# Patient Record
Sex: FEMALE | Race: WHITE | HISPANIC OR LATINO | URBAN - METROPOLITAN AREA
[De-identification: names, ages, dates, MRNs, and addresses within clinical notes are randomized per-mention and may not be internally consistent; named-entity substitution may affect disease eponyms.]

---

## 2022-09-19 ENCOUNTER — OFFICE VISIT (OUTPATIENT)
Dept: URBAN - METROPOLITAN AREA CLINIC 78 | Facility: CLINIC | Age: 62
End: 2022-09-19

## 2023-09-06 ENCOUNTER — OFFICE VISIT (OUTPATIENT)
Dept: URBAN - METROPOLITAN AREA CLINIC 12 | Facility: CLINIC | Age: 63
End: 2023-09-06

## 2023-10-02 ENCOUNTER — OFFICE VISIT (OUTPATIENT)
Dept: URBAN - METROPOLITAN AREA CLINIC 78 | Facility: CLINIC | Age: 63
End: 2023-10-02

## 2023-11-20 ENCOUNTER — OFFICE VISIT (OUTPATIENT)
Dept: URBAN - METROPOLITAN AREA CLINIC 78 | Facility: CLINIC | Age: 63
End: 2023-11-20

## 2024-01-16 ENCOUNTER — OFFICE VISIT (OUTPATIENT)
Dept: URBAN - METROPOLITAN AREA CLINIC 78 | Facility: CLINIC | Age: 64
End: 2024-01-16
Payer: COMMERCIAL

## 2024-01-16 ENCOUNTER — DASHBOARD ENCOUNTERS (OUTPATIENT)
Age: 64
End: 2024-01-16

## 2024-01-16 VITALS
HEART RATE: 82 BPM | SYSTOLIC BLOOD PRESSURE: 128 MMHG | WEIGHT: 171 LBS | BODY MASS INDEX: 32.28 KG/M2 | TEMPERATURE: 98.1 F | HEIGHT: 61 IN | DIASTOLIC BLOOD PRESSURE: 70 MMHG

## 2024-01-16 DIAGNOSIS — Z86.010 PERSONAL HISTORY OF COLONIC POLYPS: ICD-10-CM

## 2024-01-16 DIAGNOSIS — R12 HEARTBURN: ICD-10-CM

## 2024-01-16 DIAGNOSIS — K59.01 CONSTIPATION: ICD-10-CM

## 2024-01-16 DIAGNOSIS — R14.0 BLOATING: ICD-10-CM

## 2024-01-16 PROBLEM — 162864005: Status: ACTIVE | Noted: 2024-01-16

## 2024-01-16 PROCEDURE — 99244 OFF/OP CNSLTJ NEW/EST MOD 40: CPT | Performed by: INTERNAL MEDICINE

## 2024-01-16 PROCEDURE — 99204 OFFICE O/P NEW MOD 45 MIN: CPT | Performed by: INTERNAL MEDICINE

## 2024-01-16 RX ORDER — LOSARTAN POTASSIUM 100 MG/1
1 TABLET TABLET, FILM COATED ORAL ONCE A DAY
Status: ACTIVE | COMMUNITY

## 2024-01-16 RX ORDER — HYDROCHLOROTHIAZIDE 25 MG/1
1 TABLET IN THE MORNING TABLET ORAL ONCE A DAY
Status: ACTIVE | COMMUNITY

## 2024-01-16 RX ORDER — ATORVASTATIN CALCIUM 10 MG/1
1 TABLET TABLET, FILM COATED ORAL ONCE A DAY
Status: ACTIVE | COMMUNITY

## 2024-01-16 NOTE — HPI-TODAY'S VISIT:
The patient was referred to us by Brunilda Padgett for bloating and abodminal pain. A copy of this note willbe sent to the referring physician.   She used to see Dr. Kelli Tse but she is not covered under her insurance. She woudl liek to get a second opinion.  She has been having a lot of postprandial belching. She was treated empirically with Omeprazole, which she does not feel helped.  She has worsening belching at nighttime.  She has noticed that lactose products tend to make this worse.  She admits that she is overweight and has beebn trying to lose weight. She has never had an EGD. She has nver been tested for H pylori or celiac sprue.   The patient has had several colonoscopy previously. She has had TAs. There is no FH of esoph/gastric/colon canceror colon polyps.   The patient has no pertinentadditional complaints of diarrhea, rectal pain, anorexia or unintentional weight loss.   She has constipation. She will use a stool softener. She has seen bleeding on and off.   Regarding any upper GI complaints, the patient has not had nausea, vomiting ordysphagia. She has occasional heartburn, especially if she eats late at night. She has been using Pepcid OTC at night with some relief.   The patient does not take blood thinners.  They deny any CP or HUERTAS. She is on Losartan/HCTZ for HTN and Atorvastatin for hypercholesterolemia.   She is from [place].   Summary of prior workup: - MRI: Sludge, no gallstones. No biliary dilation. No PD dialtion or pancreas mass. Fatty replacement of the pancreas Spleen and kidney cysts.  - Colonoscopy in 2022: Desc and sigm diverticulosis, 12 mm TA in the transverse (removed using cold forceps) , 10 mm HP polyp in the rectum (also removed with cold forceps biopsy). Quality of the prep was good however patient was told to have a repeat colonoscopy in 1 year (?). - Colonoscopy in 2017 by Dr. Kelli Tse: 2 HP rectal polyps.

## 2024-02-07 ENCOUNTER — EGD (OUTPATIENT)
Dept: URBAN - METROPOLITAN AREA SURGERY CENTER 15 | Facility: SURGERY CENTER | Age: 64
End: 2024-02-07

## 2024-09-05 ENCOUNTER — OFFICE VISIT (OUTPATIENT)
Dept: URBAN - METROPOLITAN AREA CLINIC 78 | Facility: CLINIC | Age: 64
End: 2024-09-05

## 2025-07-21 ENCOUNTER — OFFICE VISIT (OUTPATIENT)
Dept: URBAN - METROPOLITAN AREA CLINIC 78 | Facility: CLINIC | Age: 65
End: 2025-07-21

## 2025-07-23 ENCOUNTER — OFFICE VISIT (OUTPATIENT)
Dept: URBAN - METROPOLITAN AREA CLINIC 78 | Facility: CLINIC | Age: 65
End: 2025-07-23
Payer: COMMERCIAL

## 2025-07-23 DIAGNOSIS — K21.9 GASTROESOPHAGEAL REFLUX DISEASE, UNSPECIFIED WHETHER ESOPHAGITIS PRESENT: ICD-10-CM

## 2025-07-23 DIAGNOSIS — R14.0 ABDOMINAL BLOATING: ICD-10-CM

## 2025-07-23 DIAGNOSIS — K62.5 BRBPR (BRIGHT RED BLOOD PER RECTUM): ICD-10-CM

## 2025-07-23 DIAGNOSIS — K59.09 INTERMITTENT CONSTIPATION: ICD-10-CM

## 2025-07-23 DIAGNOSIS — Z86.0100 HX OF COLONIC POLYPS: ICD-10-CM

## 2025-07-23 PROBLEM — 14760008: Status: ACTIVE | Noted: 2025-07-23

## 2025-07-23 PROBLEM — 116289008: Status: ACTIVE | Noted: 2025-07-23

## 2025-07-23 PROBLEM — 74474003: Status: ACTIVE | Noted: 2025-07-23

## 2025-07-23 PROBLEM — 235595009: Status: ACTIVE | Noted: 2025-07-23

## 2025-07-23 PROBLEM — 428283002: Status: ACTIVE | Noted: 2025-07-23

## 2025-07-23 PROCEDURE — 99214 OFFICE O/P EST MOD 30 MIN: CPT

## 2025-07-23 RX ORDER — HYDROCHLOROTHIAZIDE 25 MG/1
1 TABLET IN THE MORNING TABLET ORAL ONCE A DAY
Status: ACTIVE | COMMUNITY

## 2025-07-23 RX ORDER — FAMOTIDINE 40 MG/1
1 TABLET TABLET, FILM COATED ORAL TWICE A DAY
Qty: 180 TABLET | Refills: 5 | OUTPATIENT
Start: 2025-07-23

## 2025-07-23 RX ORDER — LOSARTAN POTASSIUM 100 MG/1
1 TABLET TABLET, FILM COATED ORAL ONCE A DAY
Status: ACTIVE | COMMUNITY

## 2025-07-23 RX ORDER — ATORVASTATIN CALCIUM 10 MG/1
1 TABLET TABLET, FILM COATED ORAL ONCE A DAY
Status: ACTIVE | COMMUNITY

## 2025-07-23 NOTE — HPI-TODAY'S VISIT:
65-year-old female, established patient, presents for EGD/ colonoscopy consult.  She deny N/V/dysphagia/abn weight loss/ abdominal pain.   She does get reflux with belching at night, usually when she eats late.   This can also depend on what she eats, like greasy meals.  She takes an otc famotidine prior to these types of meal to alleviate her reflux afterwards.  She may also take a Gas-X to help with this too. She does have postnasal drip. She notices this more so in the morning when she wakes up. This occurs most of the day in the week.   She does have a BM QD, no blood or mucus seen in the stool.  She does have a hx of IH, and sees small volume of BRB on the stool, occuring ~2x/months when she feels constipated. She does have some abd bloating, but this is relieved with having a BM.    She do not see a cardio or pulm dr. She deny use of BT//GLP1. She does 2 tablets of 200mg of  ibuprofen "every so often" for bodyaches. She denies CP/SOB.   Summary of prior workup: - MRI: Sludge, no gallstones. No biliary dilation. No PD dialtion or pancreas mass. Fatty replacement of the pancreas Spleen and kidney cysts.  - Colonoscopy in 2022: Desc and sigm diverticulosis, 12 mm TA in the transverse (removed using cold forceps) , 10 mm HP polyp in the rectum (also removed with cold forceps biopsy). Quality of the prep was good however patient was told to have a repeat colonoscopy in 1 year (?). - Colonoscopy in 2017 by Dr. Kelli Tse: 2 HP rectal polyps.

## 2025-08-15 ENCOUNTER — TELEPHONE ENCOUNTER (OUTPATIENT)
Dept: URBAN - METROPOLITAN AREA CLINIC 78 | Facility: CLINIC | Age: 65
End: 2025-08-15

## 2025-08-25 ENCOUNTER — TELEPHONE ENCOUNTER (OUTPATIENT)
Dept: URBAN - METROPOLITAN AREA CLINIC 78 | Facility: CLINIC | Age: 65
End: 2025-08-25

## 2025-08-27 ENCOUNTER — CLAIMS CREATED FROM THE CLAIM WINDOW (OUTPATIENT)
Dept: URBAN - METROPOLITAN AREA CLINIC 4 | Facility: CLINIC | Age: 65
End: 2025-08-27
Payer: COMMERCIAL

## 2025-08-27 ENCOUNTER — OFFICE VISIT (OUTPATIENT)
Dept: URBAN - METROPOLITAN AREA SURGERY CENTER 15 | Facility: SURGERY CENTER | Age: 65
End: 2025-08-27

## 2025-08-27 ENCOUNTER — TELEPHONE ENCOUNTER (OUTPATIENT)
Dept: URBAN - METROPOLITAN AREA CLINIC 78 | Facility: CLINIC | Age: 65
End: 2025-08-27

## 2025-08-27 DIAGNOSIS — K31.89 OTHER DISEASES OF STOMACH AND DUODENUM: ICD-10-CM

## 2025-08-27 DIAGNOSIS — K29.70 GASTRITIS, UNSPECIFIED, WITHOUT BLEEDING: ICD-10-CM

## 2025-08-27 DIAGNOSIS — K31.7 POLYP OF STOMACH AND DUODENUM: ICD-10-CM

## 2025-08-27 PROCEDURE — 88312 SPECIAL STAINS GROUP 1: CPT | Performed by: PATHOLOGY

## 2025-08-27 PROCEDURE — 88305 TISSUE EXAM BY PATHOLOGIST: CPT | Performed by: PATHOLOGY

## 2025-08-27 RX ORDER — OMEPRAZOLE 40 MG/1
1 CAPSULE 30 MINUTES BEFORE MORNING MEAL AND 30 MINUTES BEFORE DINNER CAPSULE, DELAYED RELEASE ORAL TWICE DAILY
Qty: 60 | Refills: 2 | OUTPATIENT
Start: 2025-08-27

## 2025-08-27 RX ORDER — FAMOTIDINE 40 MG/1
1 TABLET TABLET, FILM COATED ORAL TWICE A DAY
Qty: 180 TABLET | Refills: 5 | Status: ACTIVE | COMMUNITY
Start: 2025-07-23

## 2025-08-27 RX ORDER — HYDROCORTISONE ACETATE 25 MG/1
1 SUPPOSITORY SUPPOSITORY RECTAL
Qty: 14 | Refills: 1 | OUTPATIENT
Start: 2025-08-29 | End: 2025-09-12

## 2025-08-27 RX ORDER — ATORVASTATIN CALCIUM 10 MG/1
1 TABLET TABLET, FILM COATED ORAL ONCE A DAY
Status: ACTIVE | COMMUNITY

## 2025-08-27 RX ORDER — LOSARTAN POTASSIUM 100 MG/1
1 TABLET TABLET, FILM COATED ORAL ONCE A DAY
Status: ACTIVE | COMMUNITY

## 2025-08-27 RX ORDER — HYDROCHLOROTHIAZIDE 25 MG/1
1 TABLET IN THE MORNING TABLET ORAL ONCE A DAY
Status: ACTIVE | COMMUNITY